# Patient Record
Sex: FEMALE | Race: WHITE | Employment: PART TIME | URBAN - METROPOLITAN AREA
[De-identification: names, ages, dates, MRNs, and addresses within clinical notes are randomized per-mention and may not be internally consistent; named-entity substitution may affect disease eponyms.]

---

## 2021-04-01 ENCOUNTER — APPOINTMENT (OUTPATIENT)
Dept: PHYSICAL THERAPY | Age: 61
End: 2021-04-01
Payer: COMMERCIAL

## 2021-04-08 ENCOUNTER — HOSPITAL ENCOUNTER (OUTPATIENT)
Dept: PHYSICAL THERAPY | Age: 61
Discharge: HOME | End: 2021-04-08
Payer: COMMERCIAL

## 2021-04-08 PROCEDURE — 97165 OT EVAL LOW COMPLEX 30 MIN: CPT

## 2021-04-08 NOTE — PROGRESS NOTES
OCCUPATIONAL THERAPY - DAILY TREATMENT NOTE    Patient Name: Berna Cee        Date: 2021  : 1960   YES Patient  Verified  Visit #:     Insurance: Payor: [employer] / Plan: [email or web address] / Product Type: [employer] /      In time: 11:00 Out time: 11:20   Total Treatment Time: 20     TREATMENT AREA =  Left wrist/hand    SUBJECTIVE    Pain Level (on 0 to 10 scale):  0  / 10   Medication Changes/New allergies or changes in medical history, any new surgeries or procedures? NO    If yes, update Summary List   Subjective Functional Status/Changes:  []  No changes reported     The doctor told me not to do anything with it until I saw him tomorrow. He's worried that a bone has shifted. OBJECTIVE     min Therapeutic Exercise:  [x]  See flow sheet      min Patient Education:  NO  Reviewed HEP   []  Progressed/Changed HEP based on: Other Objective/Functional Measures:    See eval for details     Post Treatment Pain Level (on 0 to 10) scale:   0  / 10     ASSESSMENT  Assessment/Changes in Function:     Patient presents s/p left DRF with ORIF and should benefit from skilled OT once MD clears her for ROM. OT Eval Complexity Justification:  Patient History: low- fall with DRF  Examination : low- ROM and strength to measure next visit once cleared  Clinical Decision Making: low- desires to be Ind and return to work       [x]  See Progress Note/Recertification   Patient will continue to benefit from skilled OT services to address ROM deficits and address strength deficits to attain remaining goals.    Progress toward goals / Updated goals:    See eval goals      PLAN  [x]  Upgrade activities as tolerated YES Continue plan of care   []  Discharge due to :    [x]  Other: OT 2x week x 4-6 weeks for goals      Therapist: RADHA Samson/L    Date: 2021 Time: 11:38 AM

## 2021-04-08 NOTE — PROGRESS NOTES
[facility] PHYSICAL THERAPY  [address], [address] - Phone: [phone]  Fax: [phone] / 0[facility]  Patient Name: Dennie Dross : 1960   Medical   Diagnosis: Left arm pain [M79.602]  Hand pain, left [M79.642]  Pain in left wrist [M25.532] Treatment Diagnosis: Left DRF   Onset Date: 3/4/21     Referral Source: Jadon Francis MD Start of Care [facility]): 2021   Prior Hospitalization: See medical history Provider #: [id number]   Prior Level of Function: Ind;working    Comorbidities: na   Medications: Verified on Patient Summary List   The Plan of Care and following information is based on the information from the initial evaluation.   ===========================================================================================  Assessment / key information: Patient is a 55 yo right handed female s/p fall with left DRF with ORIF 3/24/21. Patient arrives with removable wrist brace and reports having an MD appt tomorrow for x ray to check position and stability of repair. She was instructed not to range wrist until return to MD. AROM left arm scapula to hand is [employer] [facility] [place] except all wrist ranges NT. Scar is healed. Strength SH 4/5, otherwise NT. Left  NT. Right 63#. Left pinch NT right 13-14#. Sensation intact. No wrist/hand pain or edema. ADLs- modified Ind using right hand. IADLs- basic tasks with right hand. FOTO score: na  ===========================================================================================  Eval Complexity: History: LOW Complexity : Brief history review ; Examination: LOW Complexity : 1-3 performance deficits relating to physical, cognitive , or psychosocial skils that result in activity limitations and / or participation restrictions ;  Decision Making:LOW Complexity : No comorbidities that affect functional and no verbal or physical assistance needed to complete eval tasks   Problem List: Decreased range of motion, Decreased strength, Decreased coordination/prehension, Decreased ADL/functional abilities  and Decreased flexibility/joint mobility   Treatment Plan may include any combination of the following: Therapeutic exercise, Therapeutic activities, Physical agent/modality, Scar management, Patient education and ADLs/IADLs  Patient / Family readiness to learn indicated by: asking questions, trying to perform skills and interest  Persons(s) to be included in education: patient (P)  Barriers to Learning/Limitations: None  Measures taken: na   Patient Goal (s): Get back to normal.   Patient self reported health status: good  Rehabilitation Potential: good   Short Term Goals: To be accomplished in 3 weeks:             once cleared by MD:   1. Ind HEP  2. Use left hand as an assist with dressing tasks   Long Term Goals: To be accomplished in 4-6  weeks: 1. Left wrist flexion 0-45 for hygiene   2. Left wrist extension to push off chairs  3. Left  30# for IADLs  4.Baseline ADLs with harry hands  Frequency / Duration:   Patient to be seen 2  times per week for 4-6  weeks:  Patient / Caregiver education and instruction: self care    Therapist Signature: DAWOOD Sam Date: 1/8/8235   Certification Period: na Time: 11:38 AM   ===========================================================================================  I certify that the above Occupational Therapy Services are being furnished while the patient is under my care. I agree with the treatment plan and certify that this therapy is necessary. Physician Signature:        Date:       Time:     Please sign and return to [facility] or you may fax the signed copy to [phone]. Thank you.                                        Irasema Phillips MD

## 2021-05-26 NOTE — PROGRESS NOTES
[facility] PHYSICAL THERAPY  [address] [facility] [address], [address] - Phone: [phone]  Fax: [phone]  [address] [place] [address]          Patient Name: Pamela Chopra : 1960   Medical/Treatment Diagnosis: Left arm pain [M79.602]  Hand pain, left [M79.642]  Pain in left wrist [M25.532]   Onset Date: 3/4/21    Referral Source: Akash Bianchi MD [facility]): 21   Prior Hospitalization: See Medical History Provider #: [id number]   Prior Level of Function: Ind;working    Comorbidities: na   Medications: Verified on Patient Summary List   Visits from [facility]: 1 Missed Visits: 0     Patient was seen for an evaluation only 21 and has not returned for treatment due to further surgery. Corinne Saab called 21 and stated they were closing her case due to more surgery, to DC OT and more orders and a new case would be opened. Assessments/Recommendations: Other: DC per Corinne Saab  If you have any questions/comments please contact us directly at [phone]. Thank you for allowing us to assist in the care of your patient. Therapist Signature: RADHA Walker/L Date: 21   Reporting Period: na Time: 10:12 AM       NOTE TO PHYSICIAN:  PLEASE COMPLETE THE ORDERS BELOW AND FAX TO   [facility] Physical [facility]: [phone]. If you are unable to process this request in 24 hours please contact our office: [phone].    ___ I have read the above report and request that my patient be discharged from therapy.      Physician Signature:        Date:       Time:                                       Akash Bianchi MD

## 2021-06-01 ENCOUNTER — APPOINTMENT (OUTPATIENT)
Dept: PHYSICAL THERAPY | Age: 61
End: 2021-06-01

## 2021-06-03 ENCOUNTER — APPOINTMENT (OUTPATIENT)
Dept: PHYSICAL THERAPY | Age: 61
End: 2021-06-03

## 2021-06-08 ENCOUNTER — APPOINTMENT (OUTPATIENT)
Dept: PHYSICAL THERAPY | Age: 61
End: 2021-06-08

## 2021-06-10 ENCOUNTER — APPOINTMENT (OUTPATIENT)
Dept: PHYSICAL THERAPY | Age: 61
End: 2021-06-10